# Patient Record
Sex: MALE | Race: WHITE | NOT HISPANIC OR LATINO | Employment: FULL TIME | URBAN - METROPOLITAN AREA
[De-identification: names, ages, dates, MRNs, and addresses within clinical notes are randomized per-mention and may not be internally consistent; named-entity substitution may affect disease eponyms.]

---

## 2023-09-29 ASSESSMENT — ENCOUNTER SYMPTOMS
SORE THROAT: 1
STRIDOR: 0
VOMITING: 0
HOARSE VOICE: 0
HEADACHES: 0
NECK PAIN: 0
SWOLLEN GLANDS: 1
TROUBLE SWALLOWING: 1
DIARRHEA: 1
ABDOMINAL PAIN: 0
COUGH: 1

## 2023-10-03 ENCOUNTER — OFFICE VISIT (OUTPATIENT)
Dept: OTOLARYNGOLOGY | Facility: CLINIC | Age: 38
End: 2023-10-03
Payer: COMMERCIAL

## 2023-10-03 VITALS — BODY MASS INDEX: 29.78 KG/M2 | HEIGHT: 70 IN | WEIGHT: 208 LBS

## 2023-10-03 DIAGNOSIS — J34.2 DEVIATED NASAL SEPTUM: ICD-10-CM

## 2023-10-03 DIAGNOSIS — H92.01 REFERRED OTALGIA OF RIGHT EAR: ICD-10-CM

## 2023-10-03 DIAGNOSIS — R07.0 THROAT PAIN IN ADULT: Primary | ICD-10-CM

## 2023-10-03 DIAGNOSIS — J39.2 CYST OF NASOPHARYNX: ICD-10-CM

## 2023-10-03 PROCEDURE — 1036F TOBACCO NON-USER: CPT | Performed by: OTOLARYNGOLOGY

## 2023-10-03 PROCEDURE — 99203 OFFICE O/P NEW LOW 30 MIN: CPT | Performed by: OTOLARYNGOLOGY

## 2023-10-03 PROCEDURE — 31575 DIAGNOSTIC LARYNGOSCOPY: CPT | Performed by: OTOLARYNGOLOGY

## 2023-10-03 RX ORDER — ESCITALOPRAM OXALATE 10 MG/1
10 TABLET ORAL DAILY
COMMUNITY

## 2023-10-03 RX ORDER — OMEPRAZOLE 40 MG/1
40 CAPSULE, DELAYED RELEASE ORAL DAILY
Qty: 30 CAPSULE | Refills: 2 | Status: SHIPPED | OUTPATIENT
Start: 2023-10-03 | End: 2024-01-01 | Stop reason: SDUPTHER

## 2023-10-03 NOTE — PROGRESS NOTES
"Chief Complaint   Patient presents with    Throat Pain     NP- THROAT SWELLING     Earache      HPI  Kye Reich is a 38 y.o. male who presents with complaints of a 3-year history of some mild continuous throat discomfort.  Rates it at a 4 out of 10.  Worse when he swallows.  No hoarseness, dysphagia, globus, throat clearing, coughing.  Occasional acid reflux.  He did take a medication for reflux for about a month without any help.  Past 2 days feeling some right ear pain.    History reviewed. No pertinent past medical history.     Medications:     Current Outpatient Medications:     escitalopram (Lexapro) 10 mg tablet, Take 1 tablet (10 mg) by mouth once daily., Disp: , Rfl:      Allergies:  No Known Allergies     ROS:  No chest pain, no shortness of breath, no nausea, no vomiting, no diarrhea, no itching, no visual changes, no focal or general neurologic changes, no urinary problems, no hearing loss     Physical Exam:  Last Recorded Vitals  Height 1.772 m (5' 9.75\"), weight 94.3 kg (208 lb). Body mass index is 30.06 kg/m².   GENERAL APPEARANCE: Well developed and well nourished.  Alert and oriented in no acute distress.  Normal vocal quality.      HEAD/FACE: No erythema or edema or facial tenderness.  Normal facial nerve function bilaterally.    EAR:       EXTERNAL: Normal pinnas and external auditory canals without lesion or obstructing wax.       MIDDLE EAR: Tympanic membranes intact and mobile with normal landmarks.  Middle ear space appears well aerated.       TUBE STATUS: N/A       MASTOID CAVITY: n/a.       HEARING: Gross hearing assessment is within normal limits.      NOSE:       VISUALIZED USING: Anterior rhinoscopy with headlight and nasal speculum.       DORSUM: Midline, nontraumatic appearance.       MUCOSA: Normal-appearing.       SECRETIONS: Normal.       SEPTUM: Deviation, left       INFERIOR TURBINATES: Normal.       MIDDLE TURBINATES/MEATUS: n/a.       BLEEDING: n/a.         ORAL " CAVITY/PHARYNX:       TEETH: Adequate dentition.       TONGUE: No mass or lesion.  Normal mobility.       FLOOR OF MOUTH: No mass or lesion.       PALATE: Normal hard palate, soft palate, and uvula.       OROPHARYNX: Normal without mass or lesion.  Tonsils absent.  Normal palpation of the tongue floor mouth and pharynx.       BUCCAL MUCOSA/GBS: Normal without mass or lesion.       LIPS: Normal.    LARYNX/HYPOPHARYNX/NASOPHARYNX: Flexible laryngoscopy was performed after consent secondary to an inadequate mirror examination.  The flexible laryngoscope was placed through the nasal cavity revealing normal nasopharynx, normal oropharynx, normal hypopharynx, and normal larynx.  There is normal bilateral vocal cord mobility without any mucosal masses or lesions.  There does appear to be a midline nasopharyngeal cyst.    NECK: No palpable masses or abnormal adenopathy.  Trachea is midline.    THYROID: No thyromegaly or palpable nodule.    SALIVARY GLANDS: Normal bilateral parotid and submandibular glands by inspection and palpation.    TMJ's: Normal.    NEURO: Cranial nerve exam grossly normal bilaterally.     Relevant Results      Assessment/Plan   Active Problems:  There are no active Hospital Problems.    Problem List Items Addressed This Visit          Otolaryngological Problems    Throat pain in adult - Primary     Other Visit Diagnoses       Referred otalgia of right ear        Cyst of nasopharynx        Deviated nasal septum            Fortunately no evidence of disease on examination.  Symptoms have been ongoing for 3 years without much change.  Wearing a pharyngeal reflux is a possibility and I did provide both verbal and written education about dietary lifestyle modifications.  We will also try omeprazole for 3 months.  Follow-up with me in 3 months for recheck.  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  We will keep an eye on this.  Having some acute right-sided ear pain.  Looks to be  referred in nature.  Recommend heat, massage, hydration.  Call if worsens over the next few days to week.  Follow up in about 3 months (around 1/3/2024).     Maurilio Polk MD

## 2023-12-29 DIAGNOSIS — R07.0 THROAT PAIN IN ADULT: ICD-10-CM

## 2024-01-01 RX ORDER — OMEPRAZOLE 40 MG/1
40 CAPSULE, DELAYED RELEASE ORAL DAILY
Qty: 90 CAPSULE | Refills: 0 | Status: SHIPPED | OUTPATIENT
Start: 2024-01-01 | End: 2024-05-02

## 2024-01-09 ENCOUNTER — OFFICE VISIT (OUTPATIENT)
Dept: OTOLARYNGOLOGY | Facility: CLINIC | Age: 39
End: 2024-01-09
Payer: COMMERCIAL

## 2024-01-09 VITALS — TEMPERATURE: 96.9 F | BODY MASS INDEX: 30.64 KG/M2 | HEIGHT: 70 IN | WEIGHT: 214 LBS

## 2024-01-09 DIAGNOSIS — R09.A2 GLOBUS PHARYNGEUS: ICD-10-CM

## 2024-01-09 DIAGNOSIS — J34.2 DEVIATED NASAL SEPTUM: ICD-10-CM

## 2024-01-09 DIAGNOSIS — R07.0 THROAT PAIN IN ADULT: Primary | ICD-10-CM

## 2024-01-09 DIAGNOSIS — J39.2 CYST OF NASOPHARYNX: ICD-10-CM

## 2024-01-09 PROCEDURE — 31575 DIAGNOSTIC LARYNGOSCOPY: CPT | Performed by: OTOLARYNGOLOGY

## 2024-01-09 PROCEDURE — 99214 OFFICE O/P EST MOD 30 MIN: CPT | Performed by: OTOLARYNGOLOGY

## 2024-01-09 PROCEDURE — 1036F TOBACCO NON-USER: CPT | Performed by: OTOLARYNGOLOGY

## 2024-01-09 NOTE — PROGRESS NOTES
"Chief Complaint   Patient presents with    Follow-up     Follow up on throat    medication has helped      HPI  Kye Reich is a 38 y.o. male who presents for 3-month follow-up with chronic complaints of a 3-year history of some mild continuous throat discomfort.  Rates it at a 4 out of 10.  Worse when he swallows.  No hoarseness, dysphagia, globus, throat clearing, coughing.  Occasional acid reflux.  He did take a medication for reflux for about a month without any help.  He has been on a PPI over the past 3 months and does think it is significantly better.  Not really having any sore throat is more of a tightness which is generalized in nature.  Right ear pain has resolved.    History reviewed. No pertinent past medical history.     Medications:     Current Outpatient Medications:     escitalopram (Lexapro) 10 mg tablet, Take 1 tablet (10 mg) by mouth once daily., Disp: , Rfl:     omeprazole (PriLOSEC) 40 mg DR capsule, TAKE 1 CAPSULE BY MOUTH ONCE DAILY, Disp: 90 capsule, Rfl: 0     Allergies:  No Known Allergies     ROS:  No chest pain, no shortness of breath, no nausea, no vomiting, no diarrhea, no itching, no visual changes, no focal or general neurologic changes, no urinary problems, no hearing loss     Physical Exam:  Last Recorded Vitals  Temperature 36.1 °C (96.9 °F), height 1.772 m (5' 9.75\"), weight 97.1 kg (214 lb). Body mass index is 30.93 kg/m².   GENERAL APPEARANCE: Well developed and well nourished.  Alert and oriented in no acute distress.  Normal vocal quality.      HEAD/FACE: No erythema or edema or facial tenderness.  Normal facial nerve function bilaterally.    EAR:       EXTERNAL: Normal pinnas and external auditory canals without lesion or obstructing wax.       MIDDLE EAR: Tympanic membranes intact and mobile with normal landmarks.  Middle ear space appears well aerated.       TUBE STATUS: N/A       MASTOID CAVITY: n/a.       HEARING: Gross hearing assessment is within normal " limits.      NOSE:       VISUALIZED USING: Anterior rhinoscopy with headlight and nasal speculum.       DORSUM: Midline, nontraumatic appearance.       MUCOSA: Normal-appearing.       SECRETIONS: Normal.       SEPTUM: Deviation, left       INFERIOR TURBINATES: Normal.       MIDDLE TURBINATES/MEATUS: n/a.       BLEEDING: n/a.         ORAL CAVITY/PHARYNX:       TEETH: Adequate dentition.       TONGUE: No mass or lesion.  Normal mobility.       FLOOR OF MOUTH: No mass or lesion.       PALATE: Normal hard palate, soft palate, and uvula.       OROPHARYNX: Normal without mass or lesion.  Tonsils absent.         BUCCAL MUCOSA/GBS: Normal without mass or lesion.       LIPS: Normal.    LARYNX/HYPOPHARYNX/NASOPHARYNX: Flexible laryngoscopy was performed after consent secondary to an inadequate mirror examination.  The flexible laryngoscope was placed through the nasal cavity revealing normal nasopharynx, normal oropharynx, normal hypopharynx, and normal larynx.  There is normal bilateral vocal cord mobility without any mucosal masses or lesions.  There does appear to be a midline nasopharyngeal cyst.    NECK: No palpable masses or abnormal adenopathy.  Trachea is midline.    THYROID: No thyromegaly or palpable nodule.    SALIVARY GLANDS: Normal bilateral parotid and submandibular glands by inspection and palpation.    TMJ's: Normal.    NEURO: Cranial nerve exam grossly normal bilaterally.     Relevant Results      Assessment/Plan   Active Problems:  There are no active Hospital Problems.    Problem List Items Addressed This Visit          Otolaryngological Problems    Throat pain in adult - Primary     Other Visit Diagnoses       Cyst of nasopharynx        Deviated nasal septum        Globus pharyngeus              Fortunately no evidence of disease on examination.  Symptoms have been ongoing for 3 years without much change.  He has had some improvement on a PPI.  At this point recommend we stop it and see how he does.  Given  a month.  Call me with an update.  If his symptoms do recur and he does need some longer-term medication would likely switch him to an H2 blocker.  Follow up if symptoms worsen or fail to improve.     Maurilio Polk MD

## 2024-05-02 DIAGNOSIS — R07.0 THROAT PAIN IN ADULT: ICD-10-CM

## 2024-05-02 RX ORDER — OMEPRAZOLE 40 MG/1
40 CAPSULE, DELAYED RELEASE ORAL DAILY
Qty: 90 CAPSULE | Refills: 0 | Status: SHIPPED | OUTPATIENT
Start: 2024-05-02